# Patient Record
Sex: FEMALE | Race: OTHER | NOT HISPANIC OR LATINO
[De-identification: names, ages, dates, MRNs, and addresses within clinical notes are randomized per-mention and may not be internally consistent; named-entity substitution may affect disease eponyms.]

---

## 2020-11-30 PROBLEM — Z00.00 ENCOUNTER FOR PREVENTIVE HEALTH EXAMINATION: Status: ACTIVE | Noted: 2020-11-30

## 2020-12-07 PROBLEM — Z00.00 ENCOUNTER FOR PREVENTIVE HEALTH EXAMINATION: Noted: 2020-12-07

## 2020-12-09 RX ORDER — ATORVASTATIN CALCIUM 10 MG/1
10 TABLET, FILM COATED ORAL
Refills: 0 | Status: ACTIVE | COMMUNITY

## 2020-12-09 RX ORDER — OMEPRAZOLE 40 MG/1
40 CAPSULE, DELAYED RELEASE ORAL
Refills: 0 | Status: ACTIVE | COMMUNITY

## 2020-12-09 RX ORDER — ENTECAVIR 1 MG/1
1 TABLET, FILM COATED ORAL
Refills: 0 | Status: ACTIVE | COMMUNITY

## 2020-12-09 RX ORDER — ESOMEPRAZOLE MAGNESIUM 20 MG/1
TABLET ORAL
Refills: 0 | Status: ACTIVE | COMMUNITY

## 2020-12-11 ENCOUNTER — APPOINTMENT (OUTPATIENT)
Dept: THORACIC SURGERY | Facility: CLINIC | Age: 50
End: 2020-12-11

## 2020-12-11 DIAGNOSIS — Z01.812 ENCOUNTER FOR PREPROCEDURAL LABORATORY EXAMINATION: ICD-10-CM

## 2020-12-16 ENCOUNTER — APPOINTMENT (OUTPATIENT)
Dept: GASTROENTEROLOGY | Facility: CLINIC | Age: 50
End: 2020-12-16
Payer: COMMERCIAL

## 2020-12-16 VITALS
BODY MASS INDEX: 23.46 KG/M2 | SYSTOLIC BLOOD PRESSURE: 183 MMHG | TEMPERATURE: 97.4 F | HEART RATE: 67 BPM | DIASTOLIC BLOOD PRESSURE: 115 MMHG | HEIGHT: 66 IN | OXYGEN SATURATION: 99 % | WEIGHT: 146 LBS

## 2020-12-16 DIAGNOSIS — K31.89 OTHER DISEASES OF STOMACH AND DUODENUM: ICD-10-CM

## 2020-12-16 DIAGNOSIS — Z87.19 PERSONAL HISTORY OF OTHER DISEASES OF THE DIGESTIVE SYSTEM: ICD-10-CM

## 2020-12-16 DIAGNOSIS — Z86.19 PERSONAL HISTORY OF OTHER INFECTIOUS AND PARASITIC DISEASES: ICD-10-CM

## 2020-12-16 PROCEDURE — 99072 ADDL SUPL MATRL&STAF TM PHE: CPT

## 2020-12-16 PROCEDURE — 99203 OFFICE O/P NEW LOW 30 MIN: CPT

## 2020-12-16 NOTE — ASSESSMENT
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\par - NPO after midnight\par - D/w pt regarding COVID testing pre-procedure as well as escort post-procedure\par - Risks of the procedure including bleeding, perforation, etc d/w the patient\par

## 2020-12-17 PROBLEM — Z01.812 PRE-PROCEDURE LAB EXAM: Status: ACTIVE | Noted: 2020-12-17

## 2020-12-18 ENCOUNTER — OUTPATIENT (OUTPATIENT)
Dept: OUTPATIENT SERVICES | Facility: HOSPITAL | Age: 50
LOS: 1 days | Discharge: HOME | End: 2020-12-18

## 2020-12-18 DIAGNOSIS — Z11.59 ENCOUNTER FOR SCREENING FOR OTHER VIRAL DISEASES: ICD-10-CM

## 2020-12-21 ENCOUNTER — RESULT REVIEW (OUTPATIENT)
Age: 50
End: 2020-12-21

## 2020-12-21 ENCOUNTER — APPOINTMENT (OUTPATIENT)
Dept: GASTROENTEROLOGY | Facility: HOSPITAL | Age: 50
End: 2020-12-21

## 2020-12-21 ENCOUNTER — OUTPATIENT (OUTPATIENT)
Dept: OUTPATIENT SERVICES | Facility: HOSPITAL | Age: 50
LOS: 1 days | Discharge: HOME | End: 2020-12-21
Payer: COMMERCIAL

## 2020-12-21 PROCEDURE — 43239 EGD BIOPSY SINGLE/MULTIPLE: CPT | Mod: XS

## 2020-12-21 PROCEDURE — 43237 ENDOSCOPIC US EXAM ESOPH: CPT

## 2020-12-21 PROCEDURE — 88305 TISSUE EXAM BY PATHOLOGIST: CPT

## 2020-12-21 PROCEDURE — 43242 EGD US FINE NEEDLE BX/ASPIR: CPT

## 2020-12-21 PROCEDURE — 88305 TISSUE EXAM BY PATHOLOGIST: CPT | Mod: 26

## 2020-12-21 PROCEDURE — 43239 EGD BIOPSY SINGLE/MULTIPLE: CPT | Mod: 59

## 2020-12-24 LAB — SURGICAL PATHOLOGY STUDY: SIGNIFICANT CHANGE UP

## 2021-01-25 NOTE — END OF VISIT
[FreeTextEntry3] : I, RONALD VILLEGAS , am scribing for and in the presence of CIERRA OLSEN the following sections: history of present illness, past medical/family/surgical/family/social history, review of systems, vital signs, physical exam, and disposition.\par

## 2021-01-25 NOTE — CONSULT LETTER
[FreeTextEntry3] : Kash Huang MD\par Professor, Cardiovascular & Thoracic Surgery\par [facility]\par Director of the [facility] \par Director of Thoracic Surgery, [facility]\par [facility]\par [address]\par [facility] 4th Floor\par [place] [address]\par Phone: [phone]\par Fax: [phone]

## 2021-01-25 NOTE — HISTORY OF PRESENT ILLNESS
[FreeTextEntry1] : 50 year old female, Mandarin speaking, self referred for mass in the antrum. Recent EGD with biopsy on 10/27/20 was negative for malignancy. 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

## 2021-01-29 ENCOUNTER — APPOINTMENT (OUTPATIENT)
Dept: THORACIC SURGERY | Facility: CLINIC | Age: 51
End: 2021-01-29

## 2021-08-17 ENCOUNTER — INPATIENT (INPATIENT)
Facility: HOSPITAL | Age: 51
LOS: 1 days | Discharge: HOME | End: 2021-08-19
Attending: PSYCHIATRY & NEUROLOGY | Admitting: PSYCHIATRY & NEUROLOGY
Payer: MEDICAID

## 2021-08-17 VITALS
WEIGHT: 169.98 LBS | DIASTOLIC BLOOD PRESSURE: 102 MMHG | SYSTOLIC BLOOD PRESSURE: 216 MMHG | HEART RATE: 85 BPM | RESPIRATION RATE: 18 BRPM | TEMPERATURE: 99 F | OXYGEN SATURATION: 99 %

## 2021-08-17 LAB
BASOPHILS # BLD AUTO: 0.01 K/UL — SIGNIFICANT CHANGE UP (ref 0–0.2)
BASOPHILS NFR BLD AUTO: 0.1 % — SIGNIFICANT CHANGE UP (ref 0–1)
EOSINOPHIL # BLD AUTO: 0.04 K/UL — SIGNIFICANT CHANGE UP (ref 0–0.7)
EOSINOPHIL NFR BLD AUTO: 0.6 % — SIGNIFICANT CHANGE UP (ref 0–8)
HCT VFR BLD CALC: 40.6 % — SIGNIFICANT CHANGE UP (ref 37–47)
HGB BLD-MCNC: 13.4 G/DL — SIGNIFICANT CHANGE UP (ref 12–16)
IMM GRANULOCYTES NFR BLD AUTO: 0.1 % — SIGNIFICANT CHANGE UP (ref 0.1–0.3)
LYMPHOCYTES # BLD AUTO: 2.61 K/UL — SIGNIFICANT CHANGE UP (ref 1.2–3.4)
LYMPHOCYTES # BLD AUTO: 37.1 % — SIGNIFICANT CHANGE UP (ref 20.5–51.1)
MCHC RBC-ENTMCNC: 29 PG — SIGNIFICANT CHANGE UP (ref 27–31)
MCHC RBC-ENTMCNC: 33 G/DL — SIGNIFICANT CHANGE UP (ref 32–37)
MCV RBC AUTO: 87.9 FL — SIGNIFICANT CHANGE UP (ref 81–99)
MONOCYTES # BLD AUTO: 0.82 K/UL — HIGH (ref 0.1–0.6)
MONOCYTES NFR BLD AUTO: 11.7 % — HIGH (ref 1.7–9.3)
NEUTROPHILS # BLD AUTO: 3.54 K/UL — SIGNIFICANT CHANGE UP (ref 1.4–6.5)
NEUTROPHILS NFR BLD AUTO: 50.4 % — SIGNIFICANT CHANGE UP (ref 42.2–75.2)
NRBC # BLD: 0 /100 WBCS — SIGNIFICANT CHANGE UP (ref 0–0)
PLATELET # BLD AUTO: 253 K/UL — SIGNIFICANT CHANGE UP (ref 130–400)
RBC # BLD: 4.62 M/UL — SIGNIFICANT CHANGE UP (ref 4.2–5.4)
RBC # FLD: 13.2 % — SIGNIFICANT CHANGE UP (ref 11.5–14.5)
WBC # BLD: 7.03 K/UL — SIGNIFICANT CHANGE UP (ref 4.8–10.8)
WBC # FLD AUTO: 7.03 K/UL — SIGNIFICANT CHANGE UP (ref 4.8–10.8)

## 2021-08-17 PROCEDURE — 70498 CT ANGIOGRAPHY NECK: CPT | Mod: 26,MA

## 2021-08-17 PROCEDURE — 70496 CT ANGIOGRAPHY HEAD: CPT | Mod: 26,MA

## 2021-08-17 PROCEDURE — 70450 CT HEAD/BRAIN W/O DYE: CPT | Mod: 26,MA,59

## 2021-08-17 PROCEDURE — 0042T: CPT

## 2021-08-17 PROCEDURE — 99285 EMERGENCY DEPT VISIT HI MDM: CPT

## 2021-08-17 RX ORDER — ASPIRIN/CALCIUM CARB/MAGNESIUM 324 MG
324 TABLET ORAL ONCE
Refills: 0 | Status: COMPLETED | OUTPATIENT
Start: 2021-08-17 | End: 2021-08-17

## 2021-08-17 RX ORDER — CLOPIDOGREL BISULFATE 75 MG/1
300 TABLET, FILM COATED ORAL ONCE
Refills: 0 | Status: COMPLETED | OUTPATIENT
Start: 2021-08-17 | End: 2021-08-17

## 2021-08-17 NOTE — CONSULT NOTE ADULT - ATTENDING COMMENTS
Patient seen and examined and agree with above except as noted.  Patients history, notes, labs, imaging, vitals and meds reviewed personally.  Patient with LUE and LLE weakness starting yesterday evening.  Symptoms slightly better but still present.  Exam shows no facial and symptoms of LLE>LUE weakness.  DDX: includes CVA, cervical pathology    Plan as above

## 2021-08-17 NOTE — STROKE CODE NOTE - IV ALTEPLASE EXCLUSION ABS OTHER
Out of the window for IV thrombolytics or IA intervention due to low NIHSS.  Discussed with Dr. Alejandro

## 2021-08-17 NOTE — ED ADULT TRIAGE NOTE - BP NONINVASIVE SYSTOLIC (MM HG)
216 Consent 2/Introductory Paragraph: Mohs surgery was explained to the patient and consent was obtained. The risks, benefits and alternatives to therapy were discussed in detail. Specifically, the risks of infection, scarring, bleeding, prolonged wound healing, incomplete removal, allergy to anesthesia, nerve injury and recurrence were addressed. Prior to the procedure, the treatment site was clearly identified and confirmed by the patient. All components of Universal Protocol/PAUSE Rule completed.

## 2021-08-17 NOTE — ED PROVIDER NOTE - PHYSICAL EXAMINATION
Physical Exam    Vital Signs: I have reviewed the initial vital signs.  Constitutional: well-nourished, appears stated age, no acute distress  Eyes: Conjunctiva pink, Sclera clear, PERRLA, EOMI.  Cardiovascular: S1 and S2, regular rate, regular rhythm, well-perfused extremities, radial pulses equal and 2+  Respiratory: unlabored respiratory effort, clear to auscultation bilaterally no wheezing, rales and rhonchi  Gastrointestinal: soft, non-tender abdomen, no pulsatile mass, normal bowl sounds  Musculoskeletal: supple neck, no lower extremity edema, no midline tenderness  Integumentary: warm, dry, no rash  Neurologic: awake, alert, cranial nerves II-XII grossly intact with LUE>LLE weakness. 4/5 strength to LUE noted with + pronator drift L side.   Psychiatric: appropriate mood, appropriate affect

## 2021-08-17 NOTE — ED ADULT TRIAGE NOTE - CHIEF COMPLAINT QUOTE
speak mandarin. pt has ha for 1 week. around 6 pm wasn't able to feel left side. left sided drift noted.

## 2021-08-17 NOTE — ED PROVIDER NOTE - ATTENDING CONTRIBUTION TO CARE
50 yo f hx headaches  pt presents for eval of LUE/LLE weakness and decreased sensation to LUE>LLE. no cp, sob, trauma. pt not on ac or antiplt.  no vertigo/syncope. pt was in usoh earlier today. pt states she has a chronic headache which has had some workup in the past. headache has been a bit worse this past week.    vs hypertensive  gen- NAD, aaox3  card-rrr  lungs-ctab, no wheezing or rhonchi  abd-sntnd, no guarding or rebound  neuro-RUE/RLE w/ full str/sensation, LUE 4/5 str, drift but does not hit bed, LLE 4+/5 str drift but does not hit bed, decrease in LUE sensation, mild decrease in LLE sensation, cn ii-xii grossly intact, normal coordination     stroke code at triage  NIH=3  stroke labs, cth,cta,ctp  rpt BP after ct    neuro recs

## 2021-08-17 NOTE — ED PROVIDER NOTE - OBJECTIVE STATEMENT
Pt is a 51 year old female with PMH  headaches, presents for eval of LUE/LLE weakness reported by pt and family. Pt and family both report weakness began at 1800. Pt denies any no cp, sob, dizziness or syncope. Denies no vertigo/syncope. pt states she has a chronic headache which has had some workup in the past. headache has been a bit worse this past week. pain is mild-moderate, non radiating with no alleviating or aggravating factors. Denies photophobia, phonophobia.

## 2021-08-17 NOTE — ED PROVIDER NOTE - CLINICAL SUMMARY MEDICAL DECISION MAKING FREE TEXT BOX
ed w/u showing possible L temporal stroke, cta w/o large occlusion. pt out of tpa window. neuro rec full asa and plavix load. neuro rec stroke unit

## 2021-08-17 NOTE — ED PROVIDER NOTE - NS ED ROS FT
Constitutional: (-) fever  Eyes/ENT: (-) blurry vision, (-) epistaxis  Cardiovascular: (-) chest pain, (-) syncope  Respiratory: (-) cough, (-) shortness of breath  Gastrointestinal: (-) vomiting, (-) diarrhea  Musculoskeletal: (-) neck pain, (-) back pain, (-) joint pain  Integumentary: (-) rash, (-) edema  Neurological: (+) headache, (-) altered mental status (+) weakness   Psychiatric: (-) hallucinations  Allergic/Immunologic: (-) pruritus

## 2021-08-18 LAB
A1C WITH ESTIMATED AVERAGE GLUCOSE RESULT: 5.4 % — SIGNIFICANT CHANGE UP (ref 4–5.6)
A1C WITH ESTIMATED AVERAGE GLUCOSE RESULT: 5.8 % — HIGH (ref 4–5.6)
ALBUMIN SERPL ELPH-MCNC: 4 G/DL — SIGNIFICANT CHANGE UP (ref 3.5–5.2)
ALBUMIN SERPL ELPH-MCNC: 4.7 G/DL — SIGNIFICANT CHANGE UP (ref 3.5–5.2)
ALP SERPL-CCNC: 60 U/L — SIGNIFICANT CHANGE UP (ref 30–115)
ALP SERPL-CCNC: 74 U/L — SIGNIFICANT CHANGE UP (ref 30–115)
ALT FLD-CCNC: 13 U/L — SIGNIFICANT CHANGE UP (ref 0–41)
ALT FLD-CCNC: 14 U/L — SIGNIFICANT CHANGE UP (ref 0–41)
ANION GAP SERPL CALC-SCNC: 12 MMOL/L — SIGNIFICANT CHANGE UP (ref 7–14)
ANION GAP SERPL CALC-SCNC: 8 MMOL/L — SIGNIFICANT CHANGE UP (ref 7–14)
APTT BLD: 36.5 SEC — SIGNIFICANT CHANGE UP (ref 27–39.2)
AST SERPL-CCNC: 15 U/L — SIGNIFICANT CHANGE UP (ref 0–41)
AST SERPL-CCNC: 17 U/L — SIGNIFICANT CHANGE UP (ref 0–41)
BASE EXCESS BLDV CALC-SCNC: 4.8 MMOL/L — HIGH (ref -2–2)
BILIRUB DIRECT SERPL-MCNC: 0.2 MG/DL — SIGNIFICANT CHANGE UP (ref 0–0.2)
BILIRUB INDIRECT FLD-MCNC: 0.6 MG/DL — SIGNIFICANT CHANGE UP (ref 0.2–1.2)
BILIRUB SERPL-MCNC: 0.8 MG/DL — SIGNIFICANT CHANGE UP (ref 0.2–1.2)
BILIRUB SERPL-MCNC: 0.9 MG/DL — SIGNIFICANT CHANGE UP (ref 0.2–1.2)
BUN SERPL-MCNC: 11 MG/DL — SIGNIFICANT CHANGE UP (ref 10–20)
BUN SERPL-MCNC: 12 MG/DL — SIGNIFICANT CHANGE UP (ref 10–20)
CALCIUM SERPL-MCNC: 8.8 MG/DL — SIGNIFICANT CHANGE UP (ref 8.5–10.1)
CALCIUM SERPL-MCNC: 9.7 MG/DL — SIGNIFICANT CHANGE UP (ref 8.5–10.1)
CHLORIDE SERPL-SCNC: 103 MMOL/L — SIGNIFICANT CHANGE UP (ref 98–110)
CHLORIDE SERPL-SCNC: 107 MMOL/L — SIGNIFICANT CHANGE UP (ref 98–110)
CHOLEST SERPL-MCNC: 175 MG/DL — SIGNIFICANT CHANGE UP
CO2 SERPL-SCNC: 27 MMOL/L — SIGNIFICANT CHANGE UP (ref 17–32)
CO2 SERPL-SCNC: 28 MMOL/L — SIGNIFICANT CHANGE UP (ref 17–32)
CREAT SERPL-MCNC: 0.7 MG/DL — SIGNIFICANT CHANGE UP (ref 0.7–1.5)
CREAT SERPL-MCNC: 0.8 MG/DL — SIGNIFICANT CHANGE UP (ref 0.7–1.5)
ESTIMATED AVERAGE GLUCOSE: 108 MG/DL — SIGNIFICANT CHANGE UP (ref 68–114)
ESTIMATED AVERAGE GLUCOSE: 120 MG/DL — HIGH (ref 68–114)
GLUCOSE SERPL-MCNC: 97 MG/DL — SIGNIFICANT CHANGE UP (ref 70–99)
GLUCOSE SERPL-MCNC: 97 MG/DL — SIGNIFICANT CHANGE UP (ref 70–99)
HCO3 BLDV-SCNC: 32 MMOL/L — HIGH (ref 22–29)
HDLC SERPL-MCNC: 49 MG/DL — LOW
INR BLD: 0.96 RATIO — SIGNIFICANT CHANGE UP (ref 0.65–1.3)
LACTATE BLDV-MCNC: 1.4 MMOL/L — SIGNIFICANT CHANGE UP (ref 0.5–1.6)
LIPID PNL WITH DIRECT LDL SERPL: 125 MG/DL — HIGH
NON HDL CHOLESTEROL: 126 MG/DL — SIGNIFICANT CHANGE UP
PCO2 BLDV: 55 MMHG — HIGH (ref 41–51)
PH BLDV: 7.37 — SIGNIFICANT CHANGE UP (ref 7.26–7.43)
PO2 BLDV: 40 MMHG — SIGNIFICANT CHANGE UP (ref 20–40)
POTASSIUM SERPL-MCNC: 4.2 MMOL/L — SIGNIFICANT CHANGE UP (ref 3.5–5)
POTASSIUM SERPL-MCNC: 4.4 MMOL/L — SIGNIFICANT CHANGE UP (ref 3.5–5)
POTASSIUM SERPL-SCNC: 4.2 MMOL/L — SIGNIFICANT CHANGE UP (ref 3.5–5)
POTASSIUM SERPL-SCNC: 4.4 MMOL/L — SIGNIFICANT CHANGE UP (ref 3.5–5)
PROT SERPL-MCNC: 6.2 G/DL — SIGNIFICANT CHANGE UP (ref 6–8)
PROT SERPL-MCNC: 7.2 G/DL — SIGNIFICANT CHANGE UP (ref 6–8)
PROTHROM AB SERPL-ACNC: 11 SEC — SIGNIFICANT CHANGE UP (ref 9.95–12.87)
SAO2 % BLDV: 76 % — SIGNIFICANT CHANGE UP
SARS-COV-2 RNA SPEC QL NAA+PROBE: SIGNIFICANT CHANGE UP
SODIUM SERPL-SCNC: 142 MMOL/L — SIGNIFICANT CHANGE UP (ref 135–146)
SODIUM SERPL-SCNC: 143 MMOL/L — SIGNIFICANT CHANGE UP (ref 135–146)
TRIGL SERPL-MCNC: 71 MG/DL — SIGNIFICANT CHANGE UP
TROPONIN T SERPL-MCNC: <0.01 NG/ML — SIGNIFICANT CHANGE UP
TSH SERPL-MCNC: 1.63 UIU/ML — SIGNIFICANT CHANGE UP (ref 0.27–4.2)

## 2021-08-18 PROCEDURE — 70551 MRI BRAIN STEM W/O DYE: CPT | Mod: 26

## 2021-08-18 PROCEDURE — 71045 X-RAY EXAM CHEST 1 VIEW: CPT | Mod: 26

## 2021-08-18 PROCEDURE — 93010 ELECTROCARDIOGRAM REPORT: CPT

## 2021-08-18 PROCEDURE — 99222 1ST HOSP IP/OBS MODERATE 55: CPT

## 2021-08-18 RX ORDER — CLOPIDOGREL BISULFATE 75 MG/1
300 TABLET, FILM COATED ORAL ONCE
Refills: 0 | Status: COMPLETED | OUTPATIENT
Start: 2021-08-18 | End: 2021-08-18

## 2021-08-18 RX ORDER — ASPIRIN/CALCIUM CARB/MAGNESIUM 324 MG
324 TABLET ORAL ONCE
Refills: 0 | Status: COMPLETED | OUTPATIENT
Start: 2021-08-18 | End: 2021-08-18

## 2021-08-18 RX ORDER — LOSARTAN POTASSIUM 100 MG/1
25 TABLET, FILM COATED ORAL DAILY
Refills: 0 | Status: DISCONTINUED | OUTPATIENT
Start: 2021-08-18 | End: 2021-08-19

## 2021-08-18 RX ORDER — SIMVASTATIN 20 MG/1
0 TABLET, FILM COATED ORAL
Qty: 0 | Refills: 0 | DISCHARGE

## 2021-08-18 RX ORDER — CHOLECALCIFEROL (VITAMIN D3) 125 MCG
0 CAPSULE ORAL
Qty: 0 | Refills: 0 | DISCHARGE

## 2021-08-18 RX ORDER — ATORVASTATIN CALCIUM 80 MG/1
80 TABLET, FILM COATED ORAL AT BEDTIME
Refills: 0 | Status: DISCONTINUED | OUTPATIENT
Start: 2021-08-18 | End: 2021-08-19

## 2021-08-18 RX ORDER — ENTECAVIR 0.5 MG/1
0 TABLET ORAL
Qty: 0 | Refills: 0 | DISCHARGE

## 2021-08-18 RX ORDER — CLOPIDOGREL BISULFATE 75 MG/1
75 TABLET, FILM COATED ORAL DAILY
Refills: 0 | Status: DISCONTINUED | OUTPATIENT
Start: 2021-08-19 | End: 2021-08-19

## 2021-08-18 RX ORDER — ENOXAPARIN SODIUM 100 MG/ML
40 INJECTION SUBCUTANEOUS DAILY
Refills: 0 | Status: DISCONTINUED | OUTPATIENT
Start: 2021-08-18 | End: 2021-08-19

## 2021-08-18 RX ORDER — ASPIRIN/CALCIUM CARB/MAGNESIUM 324 MG
81 TABLET ORAL DAILY
Refills: 0 | Status: DISCONTINUED | OUTPATIENT
Start: 2021-08-18 | End: 2021-08-19

## 2021-08-18 RX ADMIN — CLOPIDOGREL BISULFATE 300 MILLIGRAM(S): 75 TABLET, FILM COATED ORAL at 00:35

## 2021-08-18 RX ADMIN — ENOXAPARIN SODIUM 40 MILLIGRAM(S): 100 INJECTION SUBCUTANEOUS at 12:04

## 2021-08-18 RX ADMIN — LOSARTAN POTASSIUM 25 MILLIGRAM(S): 100 TABLET, FILM COATED ORAL at 07:02

## 2021-08-18 RX ADMIN — Medication 324 MILLIGRAM(S): at 00:35

## 2021-08-18 RX ADMIN — ATORVASTATIN CALCIUM 80 MILLIGRAM(S): 80 TABLET, FILM COATED ORAL at 22:06

## 2021-08-18 RX ADMIN — ATORVASTATIN CALCIUM 80 MILLIGRAM(S): 80 TABLET, FILM COATED ORAL at 03:10

## 2021-08-18 RX ADMIN — Medication 81 MILLIGRAM(S): at 12:03

## 2021-08-18 NOTE — H&P ADULT - NSHPREVIEWOFSYSTEMS_GEN_ALL_CORE
CONSTITUTIONAL: No weakness, fevers or chills, no weight loss   EYES/ENT: No visual changes;  No vertigo or throat pain   NECK: No pain or stiffness  RESPIRATORY: No cough, wheezing, hemoptysis; No shortness of breath  CARDIOVASCULAR: No chest pain or palpitations  GASTROINTESTINAL: No abdominal or epigastric pain. No nausea, vomiting, or hematemesis; No diarrhea or constipation. No melena or hematochezia.  GENITOURINARY: No dysuria, frequency or hematuria  NEUROLOGICAL: No numbness or weakness  All other review of systems is negative unless indicated above. CONSTITUTIONAL: No weakness, fevers or chills, no weight loss   EYES/ENT: No visual changes;  No vertigo or throat pain   NECK: No pain or stiffness  RESPIRATORY: No cough, wheezing, hemoptysis; No shortness of breath  CARDIOVASCULAR: No chest pain or palpitations  GASTROINTESTINAL: No abdominal or epigastric pain. No nausea, vomiting, or hematemesis; No diarrhea or constipation. No melena or hematochezia.  GENITOURINARY: No dysuria, frequency or hematuria  NEUROLOGICAL: As above  All other review of systems is negative unless indicated above.

## 2021-08-18 NOTE — OCCUPATIONAL THERAPY INITIAL EVALUATION ADULT - GENERAL OBSERVATIONS, REHAB EVAL
Pt received semi fay on stretcher in ED, +tele, +BP cuff, +pulse oxi, agreeable to OT evaluation, utilized certified mandarin  # [id number] throughout evaluation, left semi fay on stretcher in NAD RN aware

## 2021-08-18 NOTE — H&P ADULT - NSHPLABSRESULTS_GEN_ALL_CORE
13.4   7.03  )-----------( 253      ( 17 Aug 2021 23:38 )             40.6       08-17    143  |  103  |  12  ----------------------------<  97  4.2   |  28  |  0.8    Ca    9.7      17 Aug 2021 23:38    TPro  7.2  /  Alb  4.7  /  TBili  0.9  /  DBili  x   /  AST  17  /  ALT  14  /  AlkPhos  74  08-17                  PT/INR - ( 17 Aug 2021 23:38 )   PT: 11.00 sec;   INR: 0.96 ratio         PTT - ( 17 Aug 2021 23:38 )  PTT:36.5 sec    CARDIAC MARKERS ( 17 Aug 2021 23:38 )  x     / <0.01 ng/mL / x     / x     / x            CAPILLARY BLOOD GLUCOSE  102 (17 Aug 2021 23:39)      POCT Blood Glucose.: 102 mg/dL (17 Aug 2021 22:55)

## 2021-08-18 NOTE — PHYSICAL THERAPY INITIAL EVALUATION ADULT - PERTINENT HX OF CURRENT PROBLEM, REHAB EVAL
51 year old female with Past Medical History of headaches, fatty liver, "possible hepatitis?" on Entecavir presents with Left upper and left lower extremity weakness since 6 pm 08/17.. Pt referred to PT for eval and tx.

## 2021-08-18 NOTE — H&P ADULT - NSHPPHYSICALEXAM_GEN_ALL_CORE
GENERAL: NAD, lying in bed comfortably  HEAD:  Atraumatic, Normocephalic  CHEST/LUNG: Clear to auscultation bilaterally  HEART: Regular rate and rhythm  ABDOMEN: Bowel sounds present; Soft, Nontender, Nondistended  EXTREMITIES: No Peripheral edema  NERVOUS SYSTEM:  Alert & Oriented X3, left sided motor 4/5 and right 5/5, no sensory deficit noted, cranial nerves intact  MSK: FROM all 4 extremities, full and equal strength  SKIN: No rashes or lesions

## 2021-08-18 NOTE — PHYSICAL THERAPY INITIAL EVALUATION ADULT - GENERAL OBSERVATIONS, REHAB EVAL
950-1016 am Chart reviewed. Pt. seen semirecline in bed , in No apparent distress , + IV lock , telemetry , c/o numbness and weakness on left UE. Pt. agreed to activity/therapy.

## 2021-08-18 NOTE — H&P ADULT - ASSESSMENT
51 year old female with Past Medical History of headaches, fatty liver, "possible hepatitis?" on Entecavir presents with Left upper and left lower extremity weakness since 6 pm 08/17.  51 year old female with Past Medical History of headaches, fatty liver, "possible hepatitis?" on Entecavir presents with Left upper and left lower extremity weakness since 6 pm 08/17.    Patient likely has a stroke leading to her left sided weakness, 51 year old female with Past Medical History of headaches, fatty liver, "possible hepatitis?" on Entecavir presents with Left upper and left lower extremity weakness since 6 pm 08/17.    Patient likely has a stroke leading to her left sided weakness. Will load with Aspirin and Plavix and get MR Head non contrast and admit with tele.    # Left sided weakness likely 2/2 Ischemic Stroke  - Left sided weakness and numbness since 6pm yesterday  - Stroke code in ED, no t-PA NIHSS 3  - CT Head negative, CTA showing 4ml deficit in left tempora lobe  - Will load with Aspirin and Plavix  - Continue Aspirin 81 and Plavix 75  - Allow permissive hypertension  - Will get MR Head non contrast  - Ordered TTE  - Will get PT/OT/Rehab/SLP  - Neuro checks q4    # Fatty Liver  # Hepatitis? on Entecavir  - Will get hepatitis panel    # HTN  - Will start Losartan 25 daily    DVT: Lovenox  GI: Protonix  Dispo: Pending work up 51 year old female with Past Medical History of headaches, fatty liver, "possible hepatitis?" on Entecavir presents with Left upper and left lower extremity weakness since 6 pm 08/17.    Patient likely has a stroke leading to her left sided weakness. Will load with Aspirin and Plavix and get MR Head non contrast and admit with tele.    # Left sided weakness likely 2/2 Ischemic Stroke  - Left sided weakness and numbness since 6pm yesterday  - Stroke code in ED, no t-PA NIHSS 3  - CT Head negative, CTA showing 4ml deficit in left tempora lobe  - Will load with Aspirin and Plavix  - Continue Aspirin 81 and Plavix 75  - Allow permissive hypertension  - Will get MR Head non contrast  - Ordered TTE  - Will get PT/OT/Rehab/SLP  - Neuro checks q4    # Fatty Liver  # Hepatitis B? on Entecavir  - Will get hepatitis panel    # HTN  - Will start Losartan 25 daily    DVT: Lovenox  GI: Protonix  Dispo: Pending work up

## 2021-08-18 NOTE — CONSULT NOTE ADULT - SUBJECTIVE AND OBJECTIVE BOX
HPI:  51 year old female with Past Medical History of headaches, fatty liver, "possible hepatitis?" on Entecavir presents with Left upper and left lower extremity weakness since 6 pm 08/17.     As per patient she felt that her left and right arm were weak around 6pm while at home associated with subjective numbness on the left side. She reports that she has had a headache, generalized mild to moderate earlier in the day and has history of chronic headaches for which she was worked up in the past. She denies any chest pain, abdominal pain, nausea, vomiting, diarrhea, tingling or any other complaints.    In ED patient hypertensive to systolic of 200/102, HR 85, afebrile, CT head unremarkable and CTA showing a 4ml ischemic perfusion defect in the temporal lobe. At time of interview patient continues to endorse weakness of the left side and some paresthesia of the left.       PAST MEDICAL & SURGICAL HISTORY:      Hospital Course:    TODAY'S SUBJECTIVE & REVIEW OF SYMPTOMS:     Constitutional WNL   Cardio WNL   Resp WNL   GI WNL  Heme WNL  Endo WNL  Skin WNL  MSK WNL  Neuro left side weakness  Cognitive WNL  Psych WNL      MEDICATIONS  (STANDING):  aspirin enteric coated 81 milliGRAM(s) Oral daily  atorvastatin 80 milliGRAM(s) Oral at bedtime  atorvastatin 80 milliGRAM(s) Oral at bedtime  enoxaparin Injectable 40 milliGRAM(s) SubCutaneous daily  losartan 25 milliGRAM(s) Oral daily    MEDICATIONS  (PRN):      FAMILY HISTORY:      Allergies    No Known Allergies    Intolerances        SOCIAL HISTORY:    [  ] Etoh  [  ] Smoking  [  ] Substance abuse     Home Environment:  [   ] Home Alone  [ x  ] Lives with Family  [   ] Home Health Aid    Dwelling:  [   ] Apartment  [ x  ] Private House  [   ] Adult Home  [   ] Skilled Nursing Facility      [   ] Short Term  [   ] Long Term  [ x  ] Stairs       Elevator [   ]    FUNCTIONAL STATUS PTA: (Check all that apply)  Ambulation: [  x  ]Independent    [   ] Dependent     [   ] Non-Ambulatory  Assistive Device: [   ] SA Cane  [   ]  Q Cane  [   ] Walker  [   ]  Wheelchair  ADL : [  x ] Independent  [    ]  Dependent       Vital Signs Last 24 Hrs  T(C): 36.6 (18 Aug 2021 06:53), Max: 37.1 (17 Aug 2021 22:49)  T(F): 97.8 (18 Aug 2021 06:53), Max: 98.7 (17 Aug 2021 22:49)  HR: 66 (18 Aug 2021 06:53) (66 - 85)  BP: 155/71 (18 Aug 2021 06:53) (143/68 - 216/102)  BP(mean): --  RR: 18 (18 Aug 2021 06:53) (18 - 18)  SpO2: 98% (18 Aug 2021 06:53) (98% - 100%)      PHYSICAL EXAM: Awake & Alert  GENERAL: NAD  HEAD:  Normocephalic  CHEST/LUNG: Clear   HEART: S1S2+  ABDOMEN: Soft, Nontender  EXTREMITIES:  no calf tenderness    NERVOUS SYSTEM:  Cranial Nerves 2-12 intact [   ] Abnormal  [   ]  ROM: WFL all extremities [ x  ]  Abnormal [   ]  Motor Strength: WFL all extremities  [   ]  Abnormal [  x ]3-5-/5 Left side, arm weaker  Sensation: intact to light touch [   ] Abnormal [ x  ]decreased light touch left side    FUNCTIONAL STATUS:  Bed Mobility: Independent [   ]  Supervision [ x  ]  Needs Assistance [   ]  N/A [   ]  Transfers: Independent [   ]  Supervision [ x  ]  Needs Assistance [   ]  N/A [   ]   Ambulation: Independent [   ]  Supervision [  x ]  Needs Assistance [   ]  N/A [   ]  ADL: Independent [   ] Requires Assistance [   ] N/A [   ]      LABS:                        13.4   7.03  )-----------( 253      ( 17 Aug 2021 23:38 )             40.6     08-18    142  |  107  |  11  ----------------------------<  97  4.4   |  27  |  0.7    Ca    8.8      18 Aug 2021 06:30    TPro  6.2  /  Alb  4.0  /  TBili  0.8  /  DBili  0.2  /  AST  15  /  ALT  13  /  AlkPhos  60  08-18    PT/INR - ( 17 Aug 2021 23:38 )   PT: 11.00 sec;   INR: 0.96 ratio         PTT - ( 17 Aug 2021 23:38 )  PTT:36.5 sec      RADIOLOGY & ADDITIONAL STUDIES:  
Neurology Consult    Patient is a 51y old  Female who presents with a chief complaint of LHP    HPI:  Pt is a 51 year old female with PMH  headaches, presents for eval of LUE/LLE weakness reported by pt and family. Pt and family both report weakness began at 1800. Patient states she has a chronic headache which has had some workup in the past. Stroke code called on arrival. NIHSS 2 for LUE LLE drift.         PAST MEDICAL & SURGICAL HISTORY:      FAMILY HISTORY:      Social History: (-) x 3    Allergies    No Known Allergies    Intolerances        MEDICATIONS  (STANDING):  aspirin  chewable 324 milliGRAM(s) Oral once  clopidogrel Tablet 300 milliGRAM(s) Oral Once    MEDICATIONS  (PRN):    Vital Signs Last 24 Hrs  T(C): 37.1 (17 Aug 2021 22:49), Max: 37.1 (17 Aug 2021 22:49)  T(F): 98.7 (17 Aug 2021 22:49), Max: 98.7 (17 Aug 2021 22:49)  HR: 82 (17 Aug 2021 23:38) (82 - 85)  BP: 193/93 (17 Aug 2021 23:38) (193/93 - 216/102)  BP(mean): --  RR: 18 (17 Aug 2021 23:38) (18 - 18)  SpO2: 98% (17 Aug 2021 23:38) (98% - 99%)    Examination:    NIH Stroke Scale:   · NIH Stroke Scale: LOC	(0) Alert; keenly responsive  · NIH Stroke Scale: LOC Question	(0) Answers both questions correctly  · NIH Stroke Scale: LOC Command	(0) Performs both tasks correctly  · NIH Stroke Scale: Gaze	(0) Normal  · NIH Stroke Scale: Visual	(0) No visual loss  · NIH Stroke Scale: Facial	(0) Normal symmetrical movements  · NIH Stroke Scale: Arm Left	(1) Drift; limb holds 90 (or 45) degrees, but drifts down before full 10 seconds; does not hit bed or other support  · NIH Stroke Scale: Arm Right	(0) No drift; limb holds 90 (or 45) degrees for full 10 secs  · NIH Stroke Scale: Leg Left	(1) Drift; leg falls by the end of the 5-sec period but does not hit bed  · NIH Stroke Scale: Leg Right	(0) No drift; leg holds 30 degree position for full 5 secs  · NIH Stroke Scale: Ataxia	(0) Absent  · NIH Stroke Scale: Sensory	(0) Normal; no sensory loss  · NIH Stroke Scale: Language	(0) No aphasia; normal  · NIH Stroke Scale: Dysarthria	(0) Normal  · NIH Stroke Scale: Extinct Inattention	(0) No abnormality  · NIH Stroke Scale: Total	2     Pre-stroke mRS Score:  mRS Score	(0) No symptoms      Labs:                     [facility]   < [facility]: CT Angio Neck w/ IV Cont (08.17.21 @ 23:30) >  CTA head and neck:  1.  No evidence of major vascular stenosis or occlusion.  2.  Please note there is a poor contrast opacification of the bilateral distal internal carotid arteries.    CT perfusion:  1.  No core infarct.  2.  Ischemic deficit of 4 mL in the left temporal lobe.        < end of copied text >    08-17-21 @ 23:56    < from: CT Brain Stroke Protocol (08.17.21 @ 23:05) >    IMPRESSION:      No evidence of acute intracranial hemorrhage oracute territorial infarct.        < end of copied text >

## 2021-08-18 NOTE — CONSULT NOTE ADULT - ASSESSMENT
IMPRESSION: Rehab of cva / left hemiparesis    PRECAUTIONS: [   ] Cardiac  [   ] Respiratory  [   ] Seizures [   ] Contact Isolation  [   ] Droplet Isolation  [   ] Other    Weight Bearing Status:     RECOMMENDATION:    Out of Bed to Chair     DVT/Decubiti Prophylaxis    REHAB PLAN:     [  x  ] Bedside P/T 3-5 times a week   [  x  ]   Bedside O/T  2-3 times a week             [    ] Speech Therapy               [    ]  No Rehab Therapy Indicated   Conditioning/ROM                                    ADL  Bed Mobility                                               Conditioning/ROM  Transfers                                                     Bed Mobility  Sitting /Standing Balance                         Transfers                                        Gait Training                                               Sitting/Standing Balance  Stair Training [   ]Applicable                    Home equipment Eval                                                                        Splinting  [   ] Only      GOALS:   ADL   [  x  ]   Independent                    Transfers  [   x ] Independent                          Ambulation  [  x  ] Independent     [    x ] With device                            [    ]  CG                                                         [    ]  CG                                                                  [    ] CG                            [    ] Min A                                                   [    ] Min A                                                              [    ] Min  A          DISCHARGE PLAN:   [    ]  Good candidate for Intensive Rehabilitation/Hospital based                                             Will tolerate 3hrs Intensive Rehab Daily                                       [     ]  Short Term Rehab in Skilled Nursing Facility                                       [  x   ]  Home with Outpatient or VN services - outpatient PT/OT                                         [     ]  Possible Candidate for Intensive Hospital based Rehab                                       
Impression:  Pt is a 51 year old female with PMH  headaches, presents for eval of LUE/LLE weakness reported by pt and family. Pt and family both report weakness began at 1800. Patient states she has a chronic headache which has had some workup in the past. Stroke code called on arrival. NIHSS 2 for LUE LLE drift.     Suggestion:  MRI brain without soila  TTE  LDL A1C  Telemetry monitoring  Q4 neuro checks  Keep SBP under 200.  Load with Plavix 300 mg PO x1 now and start 75 mg daily.  Load with  mg PO x1 now and 81 mg daily.   Lipitor 80 mg qhs first dose now.   Please use stroke orderset when admitting to stroke unit under Dr. Trujillo.  Discussed with Dr. Alejandro and ED attending Dr. Lopez.

## 2021-08-18 NOTE — H&P ADULT - HISTORY OF PRESENT ILLNESS
51 year old female with Past Medical History of headaches presents with LUE/LLE weakness. Pt and family both report weakness began at 1800. Pt denies any no cp, sob, dizziness or syncope. Denies no vertigo/syncope. pt states she has a chronic headache which has had some workup in the past. headache has been a bit worse this past week. pain is mild-moderate, non radiating with no alleviating or aggravating factors. Denies photophobia, phonophobia. 51 year old female with Past Medical History of headaches presents with Left upper and left lower extremity weakness since 6 pm 08/17.     As per patient weakness began at 1800. Pt denies any no cp, sob, dizziness or syncope. Denies no vertigo/syncope. pt states she has a chronic headache which has had some workup in the past. headache has been a bit worse this past week. pain is mild-moderate, non radiating with no alleviating or aggravating factors. Denies photophobia, phonophobia. 51 year old female with Past Medical History of headaches presents with Left upper and left lower extremity weakness since 6 pm 08/17.     As per patient she felt that her left and right arm were weak around 6pm while at home associated with subjective numbness on the left side. She reports that she has had a headache, generalized mild to moderate earlier in the day and has history of chronic headaches for which she was worked up in the past. She denies any chest pain, abdominal pain, nausea, vomiting, diarrhea, tingling or any other complaints.    In ED patient hypertensive to systolic of 200/102, HR 85, afebrile, CT head unremarkable and CTA showing a 4ml ischemic perfusion defect in the temporal lobe. At time of interview patient continues to endorse weakness of the left side and some paresthesia of the left. 51 year old female with Past Medical History of headaches, fatty liver, "possible hepatitis?" on Entecavir presents with Left upper and left lower extremity weakness since 6 pm 08/17.     As per patient she felt that her left and right arm were weak around 6pm while at home associated with subjective numbness on the left side. She reports that she has had a headache, generalized mild to moderate earlier in the day and has history of chronic headaches for which she was worked up in the past. She denies any chest pain, abdominal pain, nausea, vomiting, diarrhea, tingling or any other complaints.    In ED patient hypertensive to systolic of 200/102, HR 85, afebrile, CT head unremarkable and CTA showing a 4ml ischemic perfusion defect in the temporal lobe. At time of interview patient continues to endorse weakness of the left side and some paresthesia of the left.

## 2021-08-19 ENCOUNTER — TRANSCRIPTION ENCOUNTER (OUTPATIENT)
Age: 51
End: 2021-08-19

## 2021-08-19 VITALS
HEART RATE: 73 BPM | TEMPERATURE: 96 F | DIASTOLIC BLOOD PRESSURE: 55 MMHG | OXYGEN SATURATION: 98 % | SYSTOLIC BLOOD PRESSURE: 123 MMHG | RESPIRATION RATE: 18 BRPM

## 2021-08-19 LAB
ALBUMIN SERPL ELPH-MCNC: 3.9 G/DL — SIGNIFICANT CHANGE UP (ref 3.5–5.2)
ALP SERPL-CCNC: 62 U/L — SIGNIFICANT CHANGE UP (ref 30–115)
ALT FLD-CCNC: 11 U/L — SIGNIFICANT CHANGE UP (ref 0–41)
ANION GAP SERPL CALC-SCNC: 8 MMOL/L — SIGNIFICANT CHANGE UP (ref 7–14)
AST SERPL-CCNC: 13 U/L — SIGNIFICANT CHANGE UP (ref 0–41)
BASOPHILS # BLD AUTO: 0.01 K/UL — SIGNIFICANT CHANGE UP (ref 0–0.2)
BASOPHILS NFR BLD AUTO: 0.2 % — SIGNIFICANT CHANGE UP (ref 0–1)
BILIRUB SERPL-MCNC: 1.3 MG/DL — HIGH (ref 0.2–1.2)
BUN SERPL-MCNC: 12 MG/DL — SIGNIFICANT CHANGE UP (ref 10–20)
CALCIUM SERPL-MCNC: 8.9 MG/DL — SIGNIFICANT CHANGE UP (ref 8.5–10.1)
CHLORIDE SERPL-SCNC: 105 MMOL/L — SIGNIFICANT CHANGE UP (ref 98–110)
CO2 SERPL-SCNC: 27 MMOL/L — SIGNIFICANT CHANGE UP (ref 17–32)
COVID-19 SPIKE DOMAIN AB INTERP: POSITIVE
COVID-19 SPIKE DOMAIN ANTIBODY RESULT: >250 U/ML — HIGH
CREAT SERPL-MCNC: 0.7 MG/DL — SIGNIFICANT CHANGE UP (ref 0.7–1.5)
EOSINOPHIL # BLD AUTO: 0.03 K/UL — SIGNIFICANT CHANGE UP (ref 0–0.7)
EOSINOPHIL NFR BLD AUTO: 0.5 % — SIGNIFICANT CHANGE UP (ref 0–8)
GLUCOSE SERPL-MCNC: 89 MG/DL — SIGNIFICANT CHANGE UP (ref 70–99)
HBV E AB SER-ACNC: REACTIVE
HBV E AG SER-ACNC: SIGNIFICANT CHANGE UP
HCT VFR BLD CALC: 40.7 % — SIGNIFICANT CHANGE UP (ref 37–47)
HGB BLD-MCNC: 13.2 G/DL — SIGNIFICANT CHANGE UP (ref 12–16)
IMM GRANULOCYTES NFR BLD AUTO: 0.2 % — SIGNIFICANT CHANGE UP (ref 0.1–0.3)
LYMPHOCYTES # BLD AUTO: 1.84 K/UL — SIGNIFICANT CHANGE UP (ref 1.2–3.4)
LYMPHOCYTES # BLD AUTO: 27.8 % — SIGNIFICANT CHANGE UP (ref 20.5–51.1)
MAGNESIUM SERPL-MCNC: 2.1 MG/DL — SIGNIFICANT CHANGE UP (ref 1.8–2.4)
MCHC RBC-ENTMCNC: 28.8 PG — SIGNIFICANT CHANGE UP (ref 27–31)
MCHC RBC-ENTMCNC: 32.4 G/DL — SIGNIFICANT CHANGE UP (ref 32–37)
MCV RBC AUTO: 88.7 FL — SIGNIFICANT CHANGE UP (ref 81–99)
MONOCYTES # BLD AUTO: 0.58 K/UL — SIGNIFICANT CHANGE UP (ref 0.1–0.6)
MONOCYTES NFR BLD AUTO: 8.8 % — SIGNIFICANT CHANGE UP (ref 1.7–9.3)
NEUTROPHILS # BLD AUTO: 4.15 K/UL — SIGNIFICANT CHANGE UP (ref 1.4–6.5)
NEUTROPHILS NFR BLD AUTO: 62.5 % — SIGNIFICANT CHANGE UP (ref 42.2–75.2)
NRBC # BLD: 0 /100 WBCS — SIGNIFICANT CHANGE UP (ref 0–0)
PLATELET # BLD AUTO: 228 K/UL — SIGNIFICANT CHANGE UP (ref 130–400)
POTASSIUM SERPL-MCNC: 4.5 MMOL/L — SIGNIFICANT CHANGE UP (ref 3.5–5)
POTASSIUM SERPL-SCNC: 4.5 MMOL/L — SIGNIFICANT CHANGE UP (ref 3.5–5)
PROT SERPL-MCNC: 6.1 G/DL — SIGNIFICANT CHANGE UP (ref 6–8)
RBC # BLD: 4.59 M/UL — SIGNIFICANT CHANGE UP (ref 4.2–5.4)
RBC # FLD: 13.3 % — SIGNIFICANT CHANGE UP (ref 11.5–14.5)
SARS-COV-2 IGG+IGM SERPL QL IA: >250 U/ML — HIGH
SARS-COV-2 IGG+IGM SERPL QL IA: POSITIVE
SODIUM SERPL-SCNC: 140 MMOL/L — SIGNIFICANT CHANGE UP (ref 135–146)
WBC # BLD: 6.62 K/UL — SIGNIFICANT CHANGE UP (ref 4.8–10.8)
WBC # FLD AUTO: 6.62 K/UL — SIGNIFICANT CHANGE UP (ref 4.8–10.8)

## 2021-08-19 PROCEDURE — 93306 TTE W/DOPPLER COMPLETE: CPT | Mod: 26

## 2021-08-19 PROCEDURE — 99232 SBSQ HOSP IP/OBS MODERATE 35: CPT

## 2021-08-19 RX ORDER — ASPIRIN/CALCIUM CARB/MAGNESIUM 324 MG
1 TABLET ORAL
Qty: 60 | Refills: 0
Start: 2021-08-19 | End: 2021-10-17

## 2021-08-19 RX ORDER — SIMVASTATIN 20 MG/1
1 TABLET, FILM COATED ORAL
Qty: 0 | Refills: 0 | DISCHARGE

## 2021-08-19 RX ORDER — ATORVASTATIN CALCIUM 80 MG/1
1 TABLET, FILM COATED ORAL
Qty: 60 | Refills: 0
Start: 2021-08-19 | End: 2021-10-17

## 2021-08-19 RX ORDER — LOSARTAN POTASSIUM 100 MG/1
1 TABLET, FILM COATED ORAL
Qty: 60 | Refills: 0
Start: 2021-08-19 | End: 2021-10-17

## 2021-08-19 RX ADMIN — ENOXAPARIN SODIUM 40 MILLIGRAM(S): 100 INJECTION SUBCUTANEOUS at 10:55

## 2021-08-19 RX ADMIN — Medication 81 MILLIGRAM(S): at 10:54

## 2021-08-19 RX ADMIN — LOSARTAN POTASSIUM 25 MILLIGRAM(S): 100 TABLET, FILM COATED ORAL at 05:14

## 2021-08-19 RX ADMIN — CLOPIDOGREL BISULFATE 75 MILLIGRAM(S): 75 TABLET, FILM COATED ORAL at 10:56

## 2021-08-19 NOTE — DISCHARGE NOTE PROVIDER - PROVIDER TOKENS
PROVIDER:[TOKEN:[[id number]],FOLLOWUP:[2 weeks]],PROVIDER:[TOKEN:[[id number]],FOLLOWUP:[2 weeks]]

## 2021-08-19 NOTE — DISCHARGE NOTE PROVIDER - CARE PROVIDER_API CALL
EFRA WYLIE  Internal Medicine  [address] [place] [address]  Phone: [phone]  Fax: [phone]  Follow Up Time: 2 weeks    Дмитрий Alejandro)  Neurology; Vascular Neurology  [address] [address]  Phone: [phone]  Fax: [phone]  Follow Up Time: 2 weeks

## 2021-08-19 NOTE — PROGRESS NOTE ADULT - ASSESSMENT
Impression;  Pt is a 51 year old female with PMH  headaches, presents for eval of LUE/LLE weakness reported by pt and family. Stroke code called on arrival. NIHSS 2 was for LUE LLE drift. MRI brain unremarkable. Patient with distal LUE numbness subjectively.     Suggestion:  Follow up in the neurology clinic for peripheral cause of LUE numbness.   Suggest C spine MRI and EMG

## 2021-08-19 NOTE — PROGRESS NOTE ADULT - ASSESSMENT
51 year old female with Past Medical History of headaches, fatty liver, "possible hepatitis?" on Entecavir presents with Left upper and left lower extremity weakness since 6 pm 08/17.    Patient likely has a stroke leading to her left sided weakness. Will load with Aspirin and Plavix and get MR Head non contrast and admit with tele.    # Left sided weakness likely 2/2 Ischemic Stroke  - Left sided weakness and numbness started 8/17/21 at 6pm  - Stroke code in ED, no t-PA NIHSS 3  - CT Head negative, CTA showing 4ml deficit in left temporal lobe  - MRI w/o Reji -ve  - Loaded with Aspirin and Plavix in the ED  - Continue Aspirin 81 and Plavix 75  - Started on Atorvastatin 80mg  - A1c: 5.8,   - Allow permissive hypertension  - F/u TTE  - PT/OT/Rehab/SLP  - As per neurology: MRI c-spine & EMG    # Fatty Liver  # Hepatitis B? on Entecavir  - F/u hepatitis panel    # HTN  - Will start Losartan 25 daily    DVT: Lovenox  GI: Protonix  Dispo: Stroke unit

## 2021-08-19 NOTE — SWALLOW BEDSIDE ASSESSMENT ADULT - SLP PERTINENT HISTORY OF CURRENT PROBLEM
Pt admitted w/ HA, L sided weakness and numbness. CTH (-), CTA: 4ml ischemic perfusion defect in the temporal lobe MRI (-) PMH: HA, fatty liver
Pt admitted w/ HA, L sided weakness and numbness. CTH (-), CTA: 4ml ischemic perfusion defect in the temporal lobe MRI is pending PMH: HA, fatty liver

## 2021-08-19 NOTE — DISCHARGE NOTE NURSING/CASE MANAGEMENT/SOCIAL WORK - PATIENT PORTAL LINK FT
You can access the FollowMyHealth Patient Portal offered by [facility] by registering at the following website: [email or web address]. By joining [facility]’s FollowMyHealth portal, you will also be able to view your health information using other applications (apps) compatible with our system.

## 2021-08-19 NOTE — DISCHARGE NOTE PROVIDER - NSDCCPCAREPLAN_GEN_ALL_CORE_FT
PRINCIPAL DISCHARGE DIAGNOSIS  Diagnosis: LUE weakness  Assessment and Plan of Treatment: Your MRI brain showed no evidence of a stroke. Symptoms in the left hand have improved but are not completely resolved.  Likely peripheral nerve involvement.  Please follow up with neurology with MRI cervical spine with no contrast and EMG.  Please return if your symptoms worsen.

## 2021-08-19 NOTE — DISCHARGE NOTE NURSING/CASE MANAGEMENT/SOCIAL WORK - NSDCPEFALRISK_GEN_ALL_CORE
For information on Fall & injury Prevention, visit [email or web address]

## 2021-08-19 NOTE — PROGRESS NOTE ADULT - SUBJECTIVE AND OBJECTIVE BOX
24H events:    Patient is a 51y old Female who presents with a chief complaint of Headache (19 Aug 2021 08:48)    Primary diagnosis of CVA (cerebral vascular accident)       Today is hospital day 1d. This morning patient was seen and examined at bedside, resting comfortably in bed.    No acute or major events overnight.    PAST MEDICAL & SURGICAL HISTORY    SOCIAL HISTORY:  Social History:  Denies smoking, alcohol or illicit drug use (18 Aug 2021 01:12)      ALLERGIES:  No Known Allergies    MEDICATIONS:  STANDING MEDICATIONS  aspirin enteric coated 81 milliGRAM(s) Oral daily  atorvastatin 80 milliGRAM(s) Oral at bedtime  atorvastatin 80 milliGRAM(s) Oral at bedtime  clopidogrel Tablet 75 milliGRAM(s) Oral daily  enoxaparin Injectable 40 milliGRAM(s) SubCutaneous daily  losartan 25 milliGRAM(s) Oral daily    PRN MEDICATIONS    VITALS:   T(F): 97.8  HR: 61  BP: 128/55  RR: 18  SpO2: 98%    PHYSICAL EXAM:  GENERAL: NAD, lying in bed comfortably  HEAD:  Atraumatic, Normocephalic  CHEST/LUNG: Clear to auscultation bilaterally  HEART: Regular rate and rhythm  ABDOMEN: Bowel sounds present; Soft, Nontender, Nondistended  EXTREMITIES: No Peripheral edema  NERVOUS SYSTEM:  Alert & Oriented X3, left sided motor 4/5 and right 5/5, no sensory deficit noted, cranial nerves intact  MSK: FROM all 4 extremities, full and equal strength  SKIN: No rashes or lesions  LABS:                        13.2   6.62  )-----------( 228      ( 19 Aug 2021 06:06 )             40.7     08-19    140  |  105  |  12  ----------------------------<  89  4.5   |  27  |  0.7    Ca    8.9      19 Aug 2021 06:06  Mg     2.1     08-19    TPro  6.1  /  Alb  3.9  /  TBili  1.3<H>  /  DBili  x   /  AST  13  /  ALT  11  /  AlkPhos  62  08-19    PT/INR - ( 17 Aug 2021 23:38 )   PT: 11.00 sec;   INR: 0.96 ratio         PTT - ( 17 Aug 2021 23:38 )  PTT:36.5 sec          CARDIAC MARKERS ( 17 Aug 2021 23:38 )  x     / <0.01 ng/mL / x     / x     / x          RADIOLOGY:          
Neurovascular follow up    Patient is a 51y old  Female who presents with a chief complaint of Headache (18 Aug 2021 12:26)      HPI:  51 year old female with Past Medical History of headaches, fatty liver, "possible hepatitis?" on Entecavir presents with Left upper and left lower extremity weakness since 6 pm 08/17.     As per patient she felt that her left and right arm were weak around 6pm while at home associated with subjective numbness on the left side. She reports that she has had a headache, generalized mild to moderate earlier in the day and has history of chronic headaches for which she was worked up in the past. She denies any chest pain, abdominal pain, nausea, vomiting, diarrhea, tingling or any other complaints.    In ED patient hypertensive to systolic of 200/102, HR 85, afebrile, CT head unremarkable and CTA showing a 4ml ischemic perfusion defect in the temporal lobe. At time of interview patient continues to endorse weakness of the left side and some paresthesia of the left. (18 Aug 2021 01:12)      PAST MEDICAL & SURGICAL HISTORY:      FAMILY HISTORY:      Social History: (-) x 3    Allergies    No Known Allergies    Intolerances        MEDICATIONS  (STANDING):  aspirin enteric coated 81 milliGRAM(s) Oral daily  atorvastatin 80 milliGRAM(s) Oral at bedtime  atorvastatin 80 milliGRAM(s) Oral at bedtime  clopidogrel Tablet 75 milliGRAM(s) Oral daily  enoxaparin Injectable 40 milliGRAM(s) SubCutaneous daily  losartan 25 milliGRAM(s) Oral daily    MEDICATIONS  (PRN):      Vital Signs Last 24 Hrs  T(C): 36.6 (19 Aug 2021 07:53), Max: 36.6 (18 Aug 2021 12:58)  T(F): 97.8 (19 Aug 2021 07:53), Max: 97.9 (18 Aug 2021 12:58)  HR: 61 (19 Aug 2021 07:53) (61 - 92)  BP: 128/55 (19 Aug 2021 07:53) (126/57 - 139/63)  BP(mean): --  RR: 18 (19 Aug 2021 07:53) (16 - 19)  SpO2: 98% (19 Aug 2021 07:53) (98% - 99%)    Examination:  General:  Appearance is consistent with chronologic age.  No abnormal facies.  Gross skin survey within normal limits.    Cognitive/Language:  The patient is oriented to person, place, time and date.  Recent and remote memory intact.  Fund of knowledge is intact and normal.  Language with normal repetition, comprehension and naming.  Nondysarthric.    Eyes: intact VA, VFF.  EOMI w/o nystagmus, skew or reported double vision.  PERRL.  No ptosis/weakness of eyelid closure.    Face:  Facial sensation normal V1 - 3, no facial asymmetry.    Ears/Nose/Throat:  Hearing grossly intact b/l.  Palate elevates midline.  Tongue and uvula midline.   Motor examination:   Normal tone, bulk and range of motion.  No tenderness, twitching, tremors or involuntary movements.  Formal Muscle Strength Testing: (MRC grade R/L) 5/5 UE; 5/5 LE.  No observable drift.  Sensory examination:   Intact to light touch in all extremities except LUE distal sensory deficit subjectively.  Cerebellum:   FTN/HKS intact with normal IVAN in all limbs.  No dysmetria or dysdiadokinesia.  Gait deferred    NIHSS 1    Labs:   CBC Full  -  ( 19 Aug 2021 06:06 )  WBC Count : 6.62 K/uL  RBC Count : 4.59 M/uL  Hemoglobin : 13.2 g/dL  Hematocrit : 40.7 %  Platelet Count - Automated : 228 K/uL  Mean Cell Volume : 88.7 fL  Mean Cell Hemoglobin : 28.8 pg  Mean Cell Hemoglobin Concentration : 32.4 g/dL  Auto Neutrophil # : 4.15 K/uL  Auto Lymphocyte # : 1.84 K/uL  Auto Monocyte # : 0.58 K/uL  Auto Eosinophil # : 0.03 K/uL  Auto Basophil # : 0.01 K/uL  Auto Neutrophil % : 62.5 %  Auto Lymphocyte % : 27.8 %  Auto Monocyte % : 8.8 %  Auto Eosinophil % : 0.5 %  Auto Basophil % : 0.2 %    08-19    140  |  105  |  12  ----------------------------<  89  4.5   |  27  |  0.7    Ca    8.9      19 Aug 2021 06:06  Mg     2.1     08-19    TPro  6.1  /  Alb  3.9  /  TBili  1.3<H>  /  DBili  x   /  AST  13  /  ALT  11  /  AlkPhos  62  08-19    LIVER FUNCTIONS - ( 19 Aug 2021 06:06 )  Alb: 3.9 g/dL / Pro: 6.1 g/dL / ALK PHOS: 62 U/L / ALT: 11 U/L / AST: 13 U/L / GGT: x           PT/INR - ( 17 Aug 2021 23:38 )   PT: 11.00 sec;   INR: 0.96 ratio         PTT - ( 17 Aug 2021 23:38 )  PTT:36.5 sec        Neuroimaging:  NCHCT:   < from: MR Head No Cont (08.18.21 @ 12:50) >    IMPRESSION:  Unremarkable noncontrast MRI of the brain.        < end of copied text >    08-19-21 @ 08:48

## 2021-08-19 NOTE — SWALLOW BEDSIDE ASSESSMENT ADULT - NS SPL SWALLOW CLINIC TRIAL FT
+toleration
frida-pharyngeal swallow is WFL for all tested consistencies w/o overt s/s of  penetration/aspiration

## 2021-08-19 NOTE — DISCHARGE NOTE PROVIDER - HOSPITAL COURSE
51 year old female with Past Medical History of headaches, fatty liver, "possible hepatitis?" on Entecavir presents with Left upper and left lower extremity weakness since 6 pm 08/17.     As per patient she felt that her left and right arm were weak around 6pm while at home associated with subjective numbness on the left side. She reports that she has had a headache, generalized mild to moderate earlier in the day and has history of chronic headaches for which she was worked up in the past. She denies any chest pain, abdominal pain, nausea, vomiting, diarrhea, tingling or any other complaints.    In ED patient hypertensive to systolic of 200/102, HR 85, afebrile, CT head unremarkable and CTA showing a 4ml ischemic perfusion defect in the temporal lobe. At time of interview patient continues to endorse weakness of the left side and some paresthesia of the left.    CT Head negative, CTA showing 4ml deficit in left temporal lobe  MRI w/o Reji -ve    Seen and evaluated by neurology. Likely periphral cause.  Follow up oupatient with MRI c-spine and EMG.  Patient is medically stable and ready for discharge.

## 2021-08-20 LAB
HAV IGM SER-ACNC: SIGNIFICANT CHANGE UP
HBV CORE IGM SER-ACNC: SIGNIFICANT CHANGE UP
HBV SURFACE AG SER-ACNC: REACTIVE
HBV SURFACE AG SER-ACNC: REACTIVE
HCV AB S/CO SERPL IA: 0.11 S/CO — SIGNIFICANT CHANGE UP (ref 0–0.99)
HCV AB SERPL-IMP: SIGNIFICANT CHANGE UP

## 2021-08-27 DIAGNOSIS — I10 ESSENTIAL (PRIMARY) HYPERTENSION: ICD-10-CM

## 2021-08-27 DIAGNOSIS — B19.10 UNSPECIFIED VIRAL HEPATITIS B WITHOUT HEPATIC COMA: ICD-10-CM

## 2021-08-27 DIAGNOSIS — G64 OTHER DISORDERS OF PERIPHERAL NERVOUS SYSTEM: ICD-10-CM

## 2021-08-27 DIAGNOSIS — K76.0 FATTY (CHANGE OF) LIVER, NOT ELSEWHERE CLASSIFIED: ICD-10-CM

## 2021-08-27 DIAGNOSIS — R51.9 HEADACHE, UNSPECIFIED: ICD-10-CM

## 2021-08-27 DIAGNOSIS — I63.9 CEREBRAL INFARCTION, UNSPECIFIED: ICD-10-CM

## 2024-01-24 NOTE — HISTORY OF PRESENT ILLNESS
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  Epigastric discomfort. + diarrhea.  No N/V. No BRBPR, no dark stools.  + acid reflux.  \par \par No famhx of gtasric, colon or pancreaic cancer.  No IBD.\par \par N[employer] worker. No
